# Patient Record
Sex: MALE
[De-identification: names, ages, dates, MRNs, and addresses within clinical notes are randomized per-mention and may not be internally consistent; named-entity substitution may affect disease eponyms.]

---

## 2021-06-16 DIAGNOSIS — Z78.9 OTHER SPECIFIED HEALTH STATUS: ICD-10-CM

## 2021-06-16 DIAGNOSIS — Z82.0 FAMILY HISTORY OF EPILEPSY AND OTHER DISEASES OF THE NERVOUS SYSTEM: ICD-10-CM

## 2021-06-16 DIAGNOSIS — R42 DIZZINESS AND GIDDINESS: ICD-10-CM

## 2021-06-16 PROBLEM — Z00.00 ENCOUNTER FOR PREVENTIVE HEALTH EXAMINATION: Status: ACTIVE | Noted: 2021-06-16

## 2021-06-16 RX ORDER — ASPIRIN ENTERIC COATED TABLETS 81 MG 81 MG/1
81 TABLET, DELAYED RELEASE ORAL DAILY
Refills: 0 | Status: ACTIVE | COMMUNITY

## 2021-06-16 RX ORDER — MULTIVITAMIN
TABLET ORAL DAILY
Refills: 0 | Status: ACTIVE | COMMUNITY

## 2021-06-16 RX ORDER — AMLODIPINE BESYLATE 10 MG/1
10 TABLET ORAL DAILY
Refills: 0 | Status: ACTIVE | COMMUNITY

## 2021-06-29 ENCOUNTER — NON-APPOINTMENT (OUTPATIENT)
Age: 56
End: 2021-06-29

## 2021-06-29 ENCOUNTER — APPOINTMENT (OUTPATIENT)
Dept: CARDIOLOGY | Facility: CLINIC | Age: 56
End: 2021-06-29
Payer: COMMERCIAL

## 2021-06-29 VITALS
OXYGEN SATURATION: 98 % | WEIGHT: 190 LBS | BODY MASS INDEX: 24.38 KG/M2 | DIASTOLIC BLOOD PRESSURE: 84 MMHG | SYSTOLIC BLOOD PRESSURE: 144 MMHG | HEART RATE: 55 BPM | HEIGHT: 74 IN

## 2021-06-29 DIAGNOSIS — I10 ESSENTIAL (PRIMARY) HYPERTENSION: ICD-10-CM

## 2021-06-29 DIAGNOSIS — R55 SYNCOPE AND COLLAPSE: ICD-10-CM

## 2021-06-29 DIAGNOSIS — Z87.898 PERSONAL HISTORY OF OTHER SPECIFIED CONDITIONS: ICD-10-CM

## 2021-06-29 DIAGNOSIS — R07.89 OTHER CHEST PAIN: ICD-10-CM

## 2021-06-29 DIAGNOSIS — R42 DIZZINESS AND GIDDINESS: ICD-10-CM

## 2021-06-29 PROCEDURE — 99204 OFFICE O/P NEW MOD 45 MIN: CPT

## 2021-06-29 PROCEDURE — 93000 ELECTROCARDIOGRAM COMPLETE: CPT

## 2021-06-29 PROCEDURE — 99072 ADDL SUPL MATRL&STAF TM PHE: CPT

## 2021-06-30 PROBLEM — Z87.898 HISTORY OF PALPITATIONS: Status: RESOLVED | Noted: 2021-06-16 | Resolved: 2021-06-30

## 2021-06-30 PROBLEM — I10 HTN (HYPERTENSION): Status: ACTIVE | Noted: 2021-06-16

## 2021-06-30 PROBLEM — R42 DIZZINESS: Status: ACTIVE | Noted: 2021-06-29

## 2021-06-30 PROBLEM — R07.89 CHEST TIGHTNESS: Status: RESOLVED | Noted: 2021-06-16 | Resolved: 2021-06-30

## 2021-06-30 LAB
CHOLEST SERPL-MCNC: 157 MG/DL
HDLC SERPL-MCNC: 54 MG/DL
LDLC SERPL CALC-MCNC: 92 MG/DL
NONHDLC SERPL-MCNC: 103 MG/DL
TRIGL SERPL-MCNC: 56 MG/DL

## 2021-06-30 NOTE — DISCUSSION/SUMMARY
[Hypertension] : hypertension [FreeTextEntry1] : \par Currently stable from a cardiovascular standpoint. Hypertensive today. Appears euvolemic. Unclear etiology of dizziness or feeling unsteady on his feet some times. Consider neurologic etiology. Prior cardiac testing results reviewed (stress ECG and echo). Continue current medications. ECG completed today and reviewed. Patient previously noted to have elevated triglycerides (non-fasting) and was prescribed atorvastatin 20 mg which he did not start. Repeat fasting lipid profile today revealed normal triglyceride level and otherwise acceptable lipid levels. Patient has appointment to see neurology. Advised patient to monitor BP and HR with future episodes. Doubt cardiac etiology. Follow up prn.

## 2021-06-30 NOTE — HISTORY OF PRESENT ILLNESS
[FreeTextEntry1] : Patient with history of HTN presents for further evaluation of symptoms of pre-syncope. Has been doing okay but has been experiencing episodes of dizziness and being off balance since September. Denies chest pain, shortness of breath or palpitations. Denies actual syncope. Also sometimes feels like he is "drunk" when walking. Also when he feels symptoms of dizziness, he may also have some trouble getting words out. Has some trouble speaking as per wife during some of the episodes.

## 2021-06-30 NOTE — CARDIOLOGY SUMMARY
[de-identified] : 06/29/21 - sinus bradycardia, first degree AV block\par  [de-identified] : 03/04/21 (exercise ECG) - 11 METS, 82% MPHR, no EKG evidence of ischemia at submaximal exercise HR\par  [de-identified] : 10/21/20 - trace MR, normal LA, trace TR, LVEF 55%

## 2021-08-19 ENCOUNTER — APPOINTMENT (OUTPATIENT)
Dept: NEUROLOGY | Facility: CLINIC | Age: 56
End: 2021-08-19
Payer: COMMERCIAL

## 2021-08-19 ENCOUNTER — NON-APPOINTMENT (OUTPATIENT)
Age: 56
End: 2021-08-19

## 2021-08-19 VITALS — HEART RATE: 71 BPM | DIASTOLIC BLOOD PRESSURE: 85 MMHG | SYSTOLIC BLOOD PRESSURE: 124 MMHG

## 2021-08-19 VITALS
SYSTOLIC BLOOD PRESSURE: 132 MMHG | HEIGHT: 74 IN | DIASTOLIC BLOOD PRESSURE: 87 MMHG | WEIGHT: 190 LBS | HEART RATE: 62 BPM | BODY MASS INDEX: 24.38 KG/M2

## 2021-08-19 VITALS — DIASTOLIC BLOOD PRESSURE: 89 MMHG | HEART RATE: 63 BPM | SYSTOLIC BLOOD PRESSURE: 133 MMHG

## 2021-08-19 DIAGNOSIS — R26.89 OTHER ABNORMALITIES OF GAIT AND MOBILITY: ICD-10-CM

## 2021-08-19 DIAGNOSIS — Z82.49 FAMILY HISTORY OF ISCHEMIC HEART DISEASE AND OTHER DISEASES OF THE CIRCULATORY SYSTEM: ICD-10-CM

## 2021-08-19 PROCEDURE — 99205 OFFICE O/P NEW HI 60 MIN: CPT

## 2021-08-19 RX ORDER — ATORVASTATIN CALCIUM 20 MG/1
20 TABLET, FILM COATED ORAL
Qty: 90 | Refills: 0 | Status: DISCONTINUED | COMMUNITY
Start: 2021-03-05 | End: 2021-08-19

## 2021-08-19 NOTE — REVIEW OF SYSTEMS
[As Noted in HPI] : as noted in HPI [Hand Weakness] :  hand weakness [Lightheadedness] : lightheadedness [Difficulty Walking] : difficulty walking [Joint Pain] : joint pain [Negative] : Heme/Lymph [Fever] : no fever [Chills] : no chills [Feeling Tired] : not feeling tired [Sleep Disturbances] : no sleep disturbances [Anxiety] : no anxiety [Tingling] : no tingling [Abnormal Sensation] : no abnormal sensation [Migraine Headache] : no migraine headache [Tension Headache] : no tension-type headache [Frequent Falls] : not falling

## 2021-08-19 NOTE — HISTORY OF PRESENT ILLNESS
[FreeTextEntry1] : Patient reports that back in September 2020 he started to experience dizziness describes as lightheaded and faint. It would last minutes and then resolve. He never passed out or lost awareness. He then went to have a cardiac workup and it was all negative. He had his blood pressure medications changed from benicar to norvasc. 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 \par He also feels headache on back of left side above neck and worse with balance. \par He is much more cautious and attentive

## 2021-08-19 NOTE — DISCUSSION/SUMMARY
[FreeTextEntry1] : 1. Patient with poor balance and gait and stiffness on the left side of his body and suggestive of early idiopathic parkinson's disease\par \par 2. Will obtain SHAYNE scan to confirm and \par \par 3. Then start PT 1-2 times per week and sinemet 25/100 1/2 tab twice daily and will titrate up to goal \par \par 4. Follow up in 2 months and all questions answered

## 2021-08-19 NOTE — PHYSICAL EXAM
[General Appearance - Alert] : alert [Oriented To Time, Place, And Person] : oriented to person, place, and time [Person] : oriented to person [Place] : oriented to place [Time] : oriented to time [Cranial Nerves Optic (II)] : visual acuity intact bilaterally,  visual fields full to confrontation, pupils equal round and reactive to light [Cranial Nerves Oculomotor (III)] : extraocular motion intact [Cranial Nerves Trigeminal (V)] : facial sensation intact symmetrically [Cranial Nerves Facial (VII)] : face symmetrical [Cranial Nerves Vestibulocochlear (VIII)] : hearing was intact bilaterally [Cranial Nerves Glossopharyngeal (IX)] : tongue and palate midline [Cranial Nerves Accessory (XI - Cranial And Spinal)] : head turning and shoulder shrug symmetric [Cranial Nerves Hypoglossal (XII)] : there was no tongue deviation with protrusion [Motor Strength] : muscle strength was normal in all four extremities [Involuntary Movements] : no involuntary movements were seen [No Muscle Atrophy] : normal bulk in all four extremities [Motor Handedness Right-Handed] : the patient is right hand dominant [Paresis Pronator Drift Right-Sided] : no pronator drift on the right [Paresis Pronator Drift Left-Sided] : no pronator drift on the left [Sensation Tactile Decrease] : light touch was intact [Sensation Pain / Temperature Decrease] : pain and temperature was intact [Romberg's Sign] : Romberg's sign was negtive [Limited Balance] : the patient's balance was impaired [Past-pointing] : there was no past-pointing [Tremor] : no tremor present [Coordination - Dysmetria Impaired Finger-to-Nose Bilateral] : not present [2+] : Brachioradialis left 2+ [3+] : Ankle jerk left 3+ [Plantar Reflex Right Only] : normal on the right [Plantar Reflex Left Only] : normal on the left [FreeTextEntry1] : patient with mask facies, overall bradykinesia and worse on the left side and postural instability and decreased fine motor movements on left side.\par \par Increased tone and stiffness on the left side and decreased arm swing bilaterally \par  [Extraocular Movements] : extraocular movements were intact [Neck Appearance] : the appearance of the neck was normal [] : no rash [Skin Lesions] : no skin lesions

## 2021-09-01 ENCOUNTER — APPOINTMENT (OUTPATIENT)
Dept: NUCLEAR MEDICINE | Facility: IMAGING CENTER | Age: 56
End: 2021-09-01
Payer: COMMERCIAL

## 2021-09-01 ENCOUNTER — RESULT REVIEW (OUTPATIENT)
Age: 56
End: 2021-09-01

## 2021-09-01 ENCOUNTER — OUTPATIENT (OUTPATIENT)
Dept: OUTPATIENT SERVICES | Facility: HOSPITAL | Age: 56
LOS: 1 days | End: 2021-09-01
Payer: COMMERCIAL

## 2021-09-01 DIAGNOSIS — G20 PARKINSON'S DISEASE: ICD-10-CM

## 2021-09-01 PROCEDURE — 78803 RP LOCLZJ TUM SPECT 1 AREA: CPT | Mod: 26

## 2021-09-01 PROCEDURE — 78803 RP LOCLZJ TUM SPECT 1 AREA: CPT

## 2021-09-01 PROCEDURE — A9584: CPT

## 2021-09-03 ENCOUNTER — NON-APPOINTMENT (OUTPATIENT)
Age: 56
End: 2021-09-03

## 2021-09-07 ENCOUNTER — NON-APPOINTMENT (OUTPATIENT)
Age: 56
End: 2021-09-07

## 2021-09-10 ENCOUNTER — TRANSCRIPTION ENCOUNTER (OUTPATIENT)
Age: 56
End: 2021-09-10

## 2021-10-15 ENCOUNTER — APPOINTMENT (OUTPATIENT)
Dept: NEUROLOGY | Facility: CLINIC | Age: 56
End: 2021-10-15

## 2021-11-09 ENCOUNTER — APPOINTMENT (OUTPATIENT)
Dept: NEUROLOGY | Facility: CLINIC | Age: 56
End: 2021-11-09
Payer: COMMERCIAL

## 2021-11-09 VITALS
HEIGHT: 74 IN | HEART RATE: 73 BPM | DIASTOLIC BLOOD PRESSURE: 82 MMHG | WEIGHT: 192 LBS | BODY MASS INDEX: 24.64 KG/M2 | SYSTOLIC BLOOD PRESSURE: 133 MMHG

## 2021-11-09 PROCEDURE — 99215 OFFICE O/P EST HI 40 MIN: CPT

## 2021-11-09 NOTE — PHYSICAL EXAM
[FreeTextEntry1] : Patient is awake and alert.  He is pleasant and cooperative with the exam.\par Face is symmetric.  Extraocular movements are intact.\par No resting action or postural tremors are seen\par Bilateral bradykinesia hand opening and closing one on the right, three on the left\par Rapid alternating movements normal on the right one on the left\par Finger taps one on the right two on the left\par Rigidity one on the right two on the left\par Leg agility normal bilaterally\par No difficulty getting up from the chair\par Gait with minimal slowing of the left leg otherwise unremarkable

## 2021-11-09 NOTE — DATA REVIEWED
[de-identified] : MRI brain done in February 2021 unremarkable\par MRI lumbar spine done September 2020 with degenerative changes most prominent at L5-S1 with bilateral neuroforaminal stenosis

## 2021-11-09 NOTE — HISTORY OF PRESENT ILLNESS
[FreeTextEntry1] : This is a 56-year-old right-handed male who presents with chief complaints of Parkinson's disease.  At this visit he is accompanied by his wife.  History is obtained from both of them and from review of records\par \par Patient states that for the last year he has noticed dragging of the left foot reduced ringing of the left arm he was reporting funny feelings in his left side and also having difficulty coming up with the right word.  An MRI brain done February 2021 was unremarkable.  MRI lumbar spine done September 28, 2020 showed degenerative disc disease and facet arthropathy most prominent at L5-S1 where there is moderate bilateral neuroforaminal stenosis.\par DaTscan done September 1, 2021 was abnormal showing dopamine dysfunction, consistent with Parkinson's disease\par \par Patient was initiated on Sinemet 25/100 half a tablet three times a day.  He reports 70% improvement of his walking balance and speaking.\par He is also undergoing physical therapy twice a week\par \par His wife reports mild memory changes mostly short-term but they have improved since levodopa was started, no hallucinations\par Denied dysphagia, drooling, urinary incontinence, anosmia ,blood pressure fluctuations or REM behavior disorder.\par Mild constipation\par \par Review of systems-a complete review of systems was performed and is negative except as listed in HPI\par \par Family history-Father had possible Parkinson's disease in his late 50s, passed away in his 70s\par

## 2021-11-09 NOTE — DISCUSSION/SUMMARY
[FreeTextEntry1] : This is a 56-year-old right-handed male with chief complaints of Parkinson's disease symptoms since 2020.  He reports 70% improvement of symptoms on half a tablet of Sinemet 25/100.  His father also developed Parkinson's disease in his 50s\par DaTscan abnormal, MRI brain unremarkable\par Impression-parkinsonism, most likely idiopathic Parkinson's disease\par \par Plan\par Discussed results of DaTscan, images reviewed\par Various treatment options including dopamine agonists, Azilect were discussed\par We will add ropinirole 0.25 mg three times a day.  Patient will gradually titrate up till he is on 1 mg three times a day in weekly increments.  Side effects were discussed in detail\par In the future we will stop levodopa\par Discussed genetics consultation, wished to hold off for now\par Neuropsych evaluation\par Encouraged to exercise and stay active \par All questions addressed and answered\par Follow-up in 1 month

## 2021-11-12 RX ORDER — ROPINIROLE 0.25 MG/1
0.25 TABLET, FILM COATED ORAL
Qty: 270 | Refills: 3 | Status: DISCONTINUED | COMMUNITY
Start: 2021-11-09 | End: 2021-11-12

## 2021-11-19 ENCOUNTER — TRANSCRIPTION ENCOUNTER (OUTPATIENT)
Age: 56
End: 2021-11-19

## 2021-11-23 ENCOUNTER — TRANSCRIPTION ENCOUNTER (OUTPATIENT)
Age: 56
End: 2021-11-23

## 2021-12-08 ENCOUNTER — TRANSCRIPTION ENCOUNTER (OUTPATIENT)
Age: 56
End: 2021-12-08

## 2022-02-01 ENCOUNTER — APPOINTMENT (OUTPATIENT)
Dept: NEUROLOGY | Facility: CLINIC | Age: 57
End: 2022-02-01
Payer: COMMERCIAL

## 2022-02-01 VITALS
SYSTOLIC BLOOD PRESSURE: 144 MMHG | HEART RATE: 68 BPM | BODY MASS INDEX: 25.04 KG/M2 | WEIGHT: 195 LBS | DIASTOLIC BLOOD PRESSURE: 90 MMHG

## 2022-02-01 PROCEDURE — 99214 OFFICE O/P EST MOD 30 MIN: CPT

## 2022-02-01 NOTE — DATA REVIEWED
[de-identified] : MRI brain done in February 2021 unremarkable\par MRI lumbar spine done September 2020 with degenerative changes most prominent at L5-S1 with bilateral neuroforaminal stenosis

## 2022-02-01 NOTE — HISTORY OF PRESENT ILLNESS
[FreeTextEntry1] : This is a 56-year-old right-handed male who presents with chief complaints of Parkinson's disease.  At this visit he is accompanied by his wife.  History is obtained from both of them and from review of records\par \par Patient states that for the last year he has noticed dragging of the left foot reduced ringing of the left arm he was reporting funny feelings in his left side and also having difficulty coming up with the right word.  An MRI brain done February 2021 was unremarkable.  MRI lumbar spine done September 28, 2020 showed degenerative disc disease and facet arthropathy most prominent at L5-S1 where there is moderate bilateral neuroforaminal stenosis.\par DaTscan done September 1, 2021 was abnormal showing dopamine dysfunction, consistent with Parkinson's disease\par \par Patient was initiated on Sinemet 25/100 half a tablet three times a day.  He reports 70% improvement of his walking balance and speaking.\par He is also undergoing physical therapy twice a week\par \par His wife reports mild memory changes mostly short-term but they have improved since levodopa was started, no hallucinations\par Denied dysphagia, drooling, urinary incontinence, anosmia ,blood pressure fluctuations or REM behavior disorder.\par Mild constipation\par \par Review of systems-a complete review of systems was performed and is negative except as listed in HPI\par \par Family history-Father had possible Parkinson's disease in his late 50s, passed away in his 70s\par \par Interval history February 1, 2022.-Patient presents to follow-up on Parkinson's disease.  He states that he tried both ropinirole and pramipexole however even at low doses he felt that his short-term memory was worse.  At present he feels that his long-term memory is excellent but short-term memory is not good.  He denies any changes in mood.  Currently taking Sinemet half a tablet twice a day.  He does feel some response with this medication.  He feels his balance is not good, he needs to concentrate on walking heel-to-toe.  He is not had any falls he goes for physical therapy twice a week.  He reports numbness, not exactly pain in his lower back and left leg.  No dysphagia no hallucinations\par

## 2022-02-01 NOTE — DISCUSSION/SUMMARY
[FreeTextEntry1] : This is a 56-year-old right-handed male with chief complaints of Parkinson's disease symptoms since 2020.  He reports 70% improvement of symptoms on half a tablet of Sinemet 25/100.  His father also developed Parkinson's disease in his 50s\par DaTscan abnormal, MRI brain unremarkable\par Did not tolerate pramipexole or ropinirole due to cognitive side effects\par \par Impression- parkinsonism, most likely idiopathic Parkinson's disease\par \par Plan\par Increase Sinemet from half a tablet twice a day to 1 tablet twice a day.  Side effects discussed in detail\par May consider Azilect in the future.  Patient denies any mood symptoms\par Neuropsych evaluation\par EMG as patient is reporting numbness in the back and left leg\par Continue physical therapy\par Follow-up in 2 months\par Discussed genetics consultation, wished to hold off for now\par Encouraged to exercise and stay active \par All questions addressed and answered\par

## 2022-03-30 ENCOUNTER — APPOINTMENT (OUTPATIENT)
Dept: NEUROLOGY | Facility: CLINIC | Age: 57
End: 2022-03-30

## 2022-05-06 LAB
FOLATE SERPL-MCNC: 17.6 NG/ML
TSH SERPL-ACNC: 1.99 UIU/ML
VIT B12 SERPL-MCNC: 338 PG/ML

## 2022-05-13 ENCOUNTER — APPOINTMENT (OUTPATIENT)
Dept: NEUROLOGY | Facility: CLINIC | Age: 57
End: 2022-05-13
Payer: COMMERCIAL

## 2022-05-13 VITALS
DIASTOLIC BLOOD PRESSURE: 82 MMHG | WEIGHT: 195 LBS | RESPIRATION RATE: 14 BRPM | HEART RATE: 62 BPM | SYSTOLIC BLOOD PRESSURE: 134 MMHG | BODY MASS INDEX: 25.03 KG/M2 | HEIGHT: 74 IN

## 2022-05-13 DIAGNOSIS — G47.30 SLEEP APNEA, UNSPECIFIED: ICD-10-CM

## 2022-05-13 PROCEDURE — 99214 OFFICE O/P EST MOD 30 MIN: CPT

## 2022-05-13 NOTE — PHYSICAL EXAM
[FreeTextEntry1] : Patient is awake and alert.  He is pleasant and cooperative with the exam.  Facial expression is reduced\par Face is symmetric.  Extraocular movements are intact.\par No resting action or postural tremors are seen\par Bilateral bradykinesia hand opening and closing one on the right, 2 on the left\par Rapid alternating movements 1 on the right 2 on the left\par Finger taps one on the right two on the left\par Rigidity one on the right two on the left\par Leg agility normal bilaterally\par No difficulty getting up from the chair\par Gait with minimal slowing of the left leg otherwise unremarkable

## 2022-05-13 NOTE — HISTORY OF PRESENT ILLNESS
[FreeTextEntry1] : This is a 56-year-old right-handed male who presents with chief complaints of Parkinson's disease.  At this visit he is accompanied by his wife.  History is obtained from both of them and from review of records\par \par Patient states that for the last year he has noticed dragging of the left foot reduced ringing of the left arm he was reporting funny feelings in his left side and also having difficulty coming up with the right word.  An MRI brain done February 2021 was unremarkable.  MRI lumbar spine done September 28, 2020 showed degenerative disc disease and facet arthropathy most prominent at L5-S1 where there is moderate bilateral neuroforaminal stenosis.\par DaTscan done September 1, 2021 was abnormal showing dopamine dysfunction, consistent with Parkinson's disease\par \par Patient was initiated on Sinemet 25/100 half a tablet three times a day.  He reports 70% improvement of his walking balance and speaking.\par He is also undergoing physical therapy twice a week\par \par His wife reports mild memory changes mostly short-term but they have improved since levodopa was started, no hallucinations\par Denied dysphagia, drooling, urinary incontinence, anosmia ,blood pressure fluctuations or REM behavior disorder.\par Mild constipation\par \par Review of systems-a complete review of systems was performed and is negative except as listed in HPI\par \par Family history-Father had possible Parkinson's disease in his late 50s, passed away in his 70s\par \par Interval history February 1, 2022.-Patient presents to follow-up on Parkinson's disease.  He states that he tried both ropinirole and pramipexole however even at low doses he felt that his short-term memory was worse.  At present he feels that his long-term memory is excellent but short-term memory is not good.  He denies any changes in mood.  Currently taking Sinemet half a tablet twice a day.  He does feel some response with this medication.  He feels his balance is not good, he needs to concentrate on walking heel-to-toe.  He is not had any falls he goes for physical therapy twice a week.  He reports numbness, not exactly pain in his lower back and left leg.  No dysphagia no hallucinations\par \par Interval history May 13, 2022-patient presents to follow-up on Parkinson's disease.  He states that he has good days and bad days.  He continues to feel that her short-term memory is not so good on some days.  The other times he feels well.  He denies any hallucinations.  He does endorse history of sleep apnea and does not use the machine because when he used it in the past he actually developed a pneumonia from it.  He feels stiffness and pain in the left upper extremity at times.  Currently uses Sinemet 1 tablet at 6 AM and 1 at 1 PM.  He goes to bed at 11 PM and wakes up at 5 PM.  Some days he feels tired and may take a nap.  He denies any dysphagia or falls.  He does exercise regularly with the [employer] and stretching.  His back pain and leg pain resolved hence he canceled the EMG appointment.  He had recent labs.  He also has an upcoming appointment with neuropsychology.  He states his mood is generally good.  He reports having erectile dysfunction and constipation\par

## 2022-05-13 NOTE — DATA REVIEWED
[de-identified] : MRI brain done in February 2021 unremarkable\par MRI lumbar spine done September 2020 with degenerative changes most prominent at L5-S1 with bilateral neuroforaminal stenosis

## 2022-05-13 NOTE — DISCUSSION/SUMMARY
[FreeTextEntry1] : This is a 56-year-old right-handed male with chief complaints of Parkinson's disease symptoms since 2020.  He reports good response to medication but is on a low-dose..  His father also developed Parkinson's disease in his 50s\par DaTscan abnormal, MRI brain unremarkable\par Did not tolerate pramipexole or ropinirole due to cognitive side effects\par \par Impression- parkinsonism, most likely idiopathic Parkinson's disease\par \par Plan\par Increase Sinemet from 1 tablet twice a day to 1 tablet 3 times a day 5 hours apart..  Side effects discussed in detail\par May consider Azilect in the future.  Patient denies any mood symptoms\par Neuropsych evaluation in 2 weeks\par EMG was canceled by the patient as his back pain and leg pain symptoms had resolved\par Recommended to follow-up with sleep medicine.  Referral provided for sleep apnea\par Advised to follow-up with urology regarding erectile dysfunction\par Increase fluid and fiber in diet and consider stool softeners for constipation\par Follow-up in 3 months\par Discussed genetics consultation, wished to hold off for now\par Encouraged to exercise and stay active \par All questions addressed and answered\par

## 2022-05-26 ENCOUNTER — APPOINTMENT (OUTPATIENT)
Dept: NEUROLOGY | Facility: CLINIC | Age: 57
End: 2022-05-26
Payer: COMMERCIAL

## 2022-05-26 PROCEDURE — 96132 NRPSYC TST EVAL PHYS/QHP 1ST: CPT

## 2022-05-26 PROCEDURE — 96121 NUBHVL XM PHY/QHP EA ADDL HR: CPT

## 2022-05-26 PROCEDURE — 96116 NUBHVL XM PHYS/QHP 1ST HR: CPT

## 2022-05-26 PROCEDURE — 96138 PSYCL/NRPSYC TECH 1ST: CPT

## 2022-05-26 PROCEDURE — 96133 NRPSYC TST EVAL PHYS/QHP EA: CPT

## 2022-05-26 PROCEDURE — 96139 PSYCL/NRPSYC TST TECH EA: CPT

## 2022-06-02 ENCOUNTER — APPOINTMENT (OUTPATIENT)
Dept: NEUROLOGY | Facility: CLINIC | Age: 57
End: 2022-06-02

## 2022-06-02 PROCEDURE — 96133 NRPSYC TST EVAL PHYS/QHP EA: CPT

## 2022-06-02 PROCEDURE — 96139 PSYCL/NRPSYC TST TECH EA: CPT

## 2022-06-02 PROCEDURE — 96121 NUBHVL XM PHY/QHP EA ADDL HR: CPT

## 2022-06-10 ENCOUNTER — APPOINTMENT (OUTPATIENT)
Dept: NEUROLOGY | Facility: CLINIC | Age: 57
End: 2022-06-10

## 2022-06-17 ENCOUNTER — APPOINTMENT (OUTPATIENT)
Dept: NEUROLOGY | Facility: CLINIC | Age: 57
End: 2022-06-17

## 2022-06-29 ENCOUNTER — APPOINTMENT (OUTPATIENT)
Dept: NEUROLOGY | Facility: CLINIC | Age: 57
End: 2022-06-29

## 2022-07-07 ENCOUNTER — APPOINTMENT (OUTPATIENT)
Dept: NEUROLOGY | Facility: CLINIC | Age: 57
End: 2022-07-07

## 2022-09-01 ENCOUNTER — APPOINTMENT (OUTPATIENT)
Dept: NEUROLOGY | Facility: CLINIC | Age: 57
End: 2022-09-01

## 2022-09-01 VITALS
HEIGHT: 74 IN | WEIGHT: 195 LBS | HEART RATE: 66 BPM | DIASTOLIC BLOOD PRESSURE: 83 MMHG | BODY MASS INDEX: 25.03 KG/M2 | SYSTOLIC BLOOD PRESSURE: 132 MMHG

## 2022-09-01 PROCEDURE — 99215 OFFICE O/P EST HI 40 MIN: CPT

## 2022-09-01 NOTE — DATA REVIEWED
[de-identified] : MRI brain done in February 2021 unremarkable\par MRI lumbar spine done September 2020 with degenerative changes most prominent at L5-S1 with bilateral neuroforaminal stenosis

## 2022-09-01 NOTE — HISTORY OF PRESENT ILLNESS
[FreeTextEntry1] : This is a 56-year-old right-handed male who presents with chief complaints of Parkinson's disease.  At this visit he is accompanied by his wife.  History is obtained from both of them and from review of records\par \par Patient states that for the last year he has noticed dragging of the left foot reduced ringing of the left arm he was reporting funny feelings in his left side and also having difficulty coming up with the right word.  An MRI brain done February 2021 was unremarkable.  MRI lumbar spine done September 28, 2020 showed degenerative disc disease and facet arthropathy most prominent at L5-S1 where there is moderate bilateral neuroforaminal stenosis.\par DaTscan done September 1, 2021 was abnormal showing dopamine dysfunction, consistent with Parkinson's disease\par \par Patient was initiated on Sinemet 25/100 half a tablet three times a day.  He reports 70% improvement of his walking balance and speaking.\par He is also undergoing physical therapy twice a week\par \par His wife reports mild memory changes mostly short-term but they have improved since levodopa was started, no hallucinations\par Denied dysphagia, drooling, urinary incontinence, anosmia ,blood pressure fluctuations or REM behavior disorder.\par Mild constipation\par \par Review of systems-a complete review of systems was performed and is negative except as listed in HPI\par \par Family history-Father had possible Parkinson's disease in his late 50s, passed away in his 70s\par \par Interval history February 1, 2022.-Patient presents to follow-up on Parkinson's disease.  He states that he tried both ropinirole and pramipexole however even at low doses he felt that his short-term memory was worse.  At present he feels that his long-term memory is excellent but short-term memory is not good.  He denies any changes in mood.  Currently taking Sinemet half a tablet twice a day.  He does feel some response with this medication.  He feels his balance is not good, he needs to concentrate on walking heel-to-toe.  He is not had any falls he goes for physical therapy twice a week.  He reports numbness, not exactly pain in his lower back and left leg.  No dysphagia no hallucinations\par \par Interval history May 13, 2022-patient presents to follow-up on Parkinson's disease.  He states that he has good days and bad days.  He continues to feel that her short-term memory is not so good on some days.  The other times he feels well.  He denies any hallucinations.  He does endorse history of sleep apnea and does not use the machine because when he used it in the past he actually developed a pneumonia from it.  He feels stiffness and pain in the left upper extremity at times.  Currently uses Sinemet 1 tablet at 6 AM and 1 at 1 PM.  He goes to bed at 11 PM and wakes up at 5 PM.  Some days he feels tired and may take a nap.  He denies any dysphagia or falls.  He does exercise regularly with the [employer] and stretching.  His back pain and leg pain resolved hence he canceled the EMG appointment.  He had recent labs.  He also has an upcoming appointment with neuropsychology.  He states his mood is generally good.  He reports having erectile dysfunction and constipation\par \par Interval history-September 1, 2022 patient presents to follow-up on Parkinson's disease.  He states that he feels a little slow in his walking and may drag his left leg.  He is also reporting return of low back pain which radiates up to his left hip.  He is not had any falls.  He is currently taking Sinemet 25/100, 1 tablet twice a day 5:30 in the morning and at 12 noon.  He denies any side effects on this.  No falls no difficulty swallowing no difficulty with control of blood pressure no REM behavior disorder or vivid dreams at present time he has had some vivid dreams in the past.  Memory is unchanged he had neuropsych testing done recently.  Continues to have constipation and uses Metamucil tablets as needed\par

## 2022-09-01 NOTE — DISCUSSION/SUMMARY
[FreeTextEntry1] : This is a 57-year-old right-handed male with chief complaints of Parkinson's disease symptoms since 2020.  He reports good response to medication but is on a low-dose..  His father also developed Parkinson's disease in his 50s\par DaTscan abnormal, MRI brain unremarkable\par Did not tolerate pramipexole or ropinirole due to cognitive side effects\par Neuropsych evaluation-[facility]\par \par Impression- parkinsonism, most likely idiopathic Parkinson's disease, left sided sciatica\par \par Plan\par Gabapentin 100 mg 3 times a day to help with radicular pain\par After 1 to 2 weeks of starting gabapentin he will start Azilect 0.5 mg daily\par Physical therapy\par If symptoms not improved with gabapentin and physical therapy will consider EMG nerve conduction study and referral to Ortho\par Increase Sinemet from 1 tablet twice a day to 1 tablet 3 times a day 5 hours apart..  Side effects discussed in detail\par Recommended to follow-up with sleep medicine.  Rule out sleep apnea\par Increase fluid and fiber in diet and consider stool softeners for constipation\par Follow-up in 3 months\par Discussed genetics consultation, wished to hold off for now\par Encouraged to exercise and stay active \par All questions addressed and answered\par Total face-to-face time spent with the patient 40 minutes

## 2022-09-01 NOTE — PHYSICAL EXAM
[FreeTextEntry1] : Patient is awake and alert.  He is pleasant and cooperative with the exam.  Facial expression is reduced\par Face is symmetric.  Extraocular movements are intact.\par No resting action or postural tremors are seen\par Mild bradykinesia hand opening and closing normal on the right, 1 on the left\par Rapid alternating movements 0 on the right 1 on the left\par Finger taps 0 on the right 1 on the left\par Mild rigidity on the left\par Leg agility normal bilaterally\par No difficulty getting up from the chair\par Gait with minimal slowing of the left leg and reduced left arm swing otherwise unremarkable\par Pull test is negative\par Deep tendon reflexes are equal and symmetric, strength 5/5

## 2023-01-13 ENCOUNTER — APPOINTMENT (OUTPATIENT)
Dept: NEUROLOGY | Facility: CLINIC | Age: 58
End: 2023-01-13
Payer: COMMERCIAL

## 2023-01-13 VITALS
BODY MASS INDEX: 25.41 KG/M2 | DIASTOLIC BLOOD PRESSURE: 91 MMHG | WEIGHT: 198 LBS | SYSTOLIC BLOOD PRESSURE: 147 MMHG | HEIGHT: 74 IN | HEART RATE: 62 BPM

## 2023-01-13 PROCEDURE — 99214 OFFICE O/P EST MOD 30 MIN: CPT

## 2023-01-13 RX ORDER — PRAMIPEXOLE DIHYDROCHLORIDE 0.12 MG/1
0.12 TABLET ORAL 3 TIMES DAILY
Qty: 90 | Refills: 3 | Status: DISCONTINUED | COMMUNITY
Start: 2021-11-12 | End: 2023-01-13

## 2023-01-13 RX ORDER — GABAPENTIN 100 MG/1
100 CAPSULE ORAL
Qty: 90 | Refills: 3 | Status: DISCONTINUED | COMMUNITY
Start: 2022-09-01 | End: 2023-01-13

## 2023-01-13 RX ORDER — RASAGILINE 0.5 MG/1
0.5 TABLET ORAL
Qty: 30 | Refills: 3 | Status: DISCONTINUED | COMMUNITY
Start: 2022-09-01 | End: 2023-01-13

## 2023-01-13 NOTE — DATA REVIEWED
[de-identified] : MRI brain done in February 2021 unremarkable\par MRI lumbar spine done September 2020 with degenerative changes most prominent at L5-S1 with bilateral neuroforaminal stenosis

## 2023-01-13 NOTE — HISTORY OF PRESENT ILLNESS
[FreeTextEntry1] : This is a 56-year-old right-handed male who presents with chief complaints of Parkinson's disease.  At this visit he is accompanied by his wife.  History is obtained from both of them and from review of records\par \par Patient states that for the last year he has noticed dragging of the left foot reduced ringing of the left arm he was reporting funny feelings in his left side and also having difficulty coming up with the right word.  An MRI brain done February 2021 was unremarkable.  MRI lumbar spine done September 28, 2020 showed degenerative disc disease and facet arthropathy most prominent at L5-S1 where there is moderate bilateral neuroforaminal stenosis.\par DaTscan done September 1, 2021 was abnormal showing dopamine dysfunction, consistent with Parkinson's disease\par \par Patient was initiated on Sinemet 25/100 half a tablet three times a day.  He reports 70% improvement of his walking balance and speaking.\par He is also undergoing physical therapy twice a week\par \par His wife reports mild memory changes mostly short-term but they have improved since levodopa was started, no hallucinations\par Denied dysphagia, drooling, urinary incontinence, anosmia ,blood pressure fluctuations or REM behavior disorder.\par Mild constipation\par \par Review of systems-a complete review of systems was performed and is negative except as listed in HPI\par \par Family history-Father had possible Parkinson's disease in his late 50s, passed away in his 70s\par \par Interval history February 1, 2022.-Patient presents to follow-up on Parkinson's disease.  He states that he tried both ropinirole and pramipexole however even at low doses he felt that his short-term memory was worse.  At present he feels that his long-term memory is excellent but short-term memory is not good.  He denies any changes in mood.  Currently taking Sinemet half a tablet twice a day.  He does feel some response with this medication.  He feels his balance is not good, he needs to concentrate on walking heel-to-toe.  He is not had any falls he goes for physical therapy twice a week.  He reports numbness, not exactly pain in his lower back and left leg.  No dysphagia no hallucinations\par \par Interval history May 13, 2022-patient presents to follow-up on Parkinson's disease.  He states that he has good days and bad days.  He continues to feel that her short-term memory is not so good on some days.  The other times he feels well.  He denies any hallucinations.  He does endorse history of sleep apnea and does not use the machine because when he used it in the past he actually developed a pneumonia from it.  He feels stiffness and pain in the left upper extremity at times.  Currently uses Sinemet 1 tablet at 6 AM and 1 at 1 PM.  He goes to bed at 11 PM and wakes up at 5 PM.  Some days he feels tired and may take a nap.  He denies any dysphagia or falls.  He does exercise regularly with the Pel[employer] and stretching.  His back pain and leg pain resolved hence he canceled the EMG appointment.  He had recent labs.  He also has an upcoming appointment with neuropsychology.  He states his mood is generally good.  He reports having erectile dysfunction and constipation\par \par Interval history-September 1, 2022 patient presents to follow-up on Parkinson's disease.  He states that he feels a little slow in his walking and may drag his left leg.  He is also reporting return of low back pain which radiates up to his left hip.  He is not had any falls.  He is currently taking Sinemet 25/100, 1 tablet twice a day 5:30 in the morning and at 12 noon.  He denies any side effects on this.  No falls no difficulty swallowing no difficulty with control of blood pressure no REM behavior disorder or vivid dreams at present time he has had some vivid dreams in the past.  Memory is unchanged he had neuropsych testing done recently.  Continues to have constipation and uses Metamucil tablets as needed\par \par Interval history January 13, 2023.  Patient presents to follow-up on Parkinson's disease overall states that he is doing well still taking only Sinemet twice a day and finds that that is helpful if he does not take his medication in the morning he feels a little stiffness in his neck.  He is noticing some slowness while walking and has no difficulty running and has resumed basketball.  States that balance is good has not had any falls denies any difficulty swallowing.  Left-sided sciatica has improved with physical therapy even a small dose of gabapentin was making him sleepy and he is not on it anymore.  Did not try Azilect.  Still continues to have constipation and is taking stool softeners for it\par

## 2023-01-13 NOTE — DISCUSSION/SUMMARY
[FreeTextEntry1] : This is a 57-year-old right-handed male with chief complaints of Parkinson's disease symptoms since 2020.  He reports good response to medication but is on a low-dose..  His father also developed Parkinson's disease in his 50s\par DaTscan abnormal, MRI brain unremarkable\par Did not tolerate pramipexole or ropinirole due to cognitive side effects\par Neuropsych evaluation-[facility]\par \par Impression- parkinsonism, most likely idiopathic Parkinson's disease, left sided sciatica, improved with physical therapy\par \par Plan\par Patient wishes to continue Sinemet 1 tablet twice a day.  States that he has no difficulties in the evening.\par Increase fluid and fiber in diet and consider stool softeners for constipation\par Follow-up in 4-5 months\par In the past discussed genetics consultation, wished to hold off \par Encouraged to exercise and stay active, careful while playing basketball\par All questions addressed and answered\par

## 2023-05-24 ENCOUNTER — APPOINTMENT (OUTPATIENT)
Dept: NEUROLOGY | Facility: CLINIC | Age: 58
End: 2023-05-24
Payer: COMMERCIAL

## 2023-05-24 VITALS
BODY MASS INDEX: 24.13 KG/M2 | HEART RATE: 61 BPM | DIASTOLIC BLOOD PRESSURE: 85 MMHG | HEIGHT: 74 IN | SYSTOLIC BLOOD PRESSURE: 132 MMHG | WEIGHT: 188 LBS

## 2023-05-24 VITALS — DIASTOLIC BLOOD PRESSURE: 77 MMHG | HEART RATE: 66 BPM | SYSTOLIC BLOOD PRESSURE: 116 MMHG

## 2023-05-24 DIAGNOSIS — R20.2 PARESTHESIA OF SKIN: ICD-10-CM

## 2023-05-24 PROCEDURE — 99214 OFFICE O/P EST MOD 30 MIN: CPT

## 2023-05-25 NOTE — PHYSICAL EXAM
[FreeTextEntry1] : Patient is awake and alert.  He is pleasant and cooperative with the exam.  Facial expression is reduced\par Face is symmetric.  Extraocular movements are intact.\par No resting action or postural tremors are seen\par  bradykinesia hand opening and closing 1 on the right, 1-2 on the left\par Rapid alternating movements 0 on the right 1 on the left\par Finger taps 0 on the right 1 on the left\par Mild rigidity on the left\par Leg agility normal bilaterally\par No difficulty getting up from the chair\par Gait with minimal slowing of the left leg and reduced left arm swing otherwise unremarkable\par Pull test is negative\par Deep tendon reflexes are equal and symmetric, strength 5/5\par No rashes are seen on the back.  Pinprick and light touch normal on both sides

## 2023-05-25 NOTE — DATA REVIEWED
[de-identified] : MRI brain done in February 2021 unremarkable\par MRI lumbar spine done September 2020 with degenerative changes most prominent at L5-S1 with bilateral neuroforaminal stenosis

## 2023-05-25 NOTE — DISCUSSION/SUMMARY
[FreeTextEntry1] : This is a 57-year-old right-handed male with chief complaints of Parkinson's disease symptoms since 2020.  He reports good response to medication but is on a low-dose..  His father also developed Parkinson's disease in his 50s\par DaTscan abnormal, MRI brain unremarkable\par Did not tolerate pramipexole or ropinirole due to cognitive side effects\par Neuropsych evaluation-[facility]\par \par Impression- parkinsonism, most likely idiopathic Parkinson's disease, left sided sciatica, improved with physical therapy\par \par Plan\par Patient is reporting area of numbness in the left mid back.  No rashes is seen.  Suggest MRI cervical and thoracic spine with and without contrast.  He is also advised to follow with his primary care physician\par Patient wishes to continue Sinemet 1 tablet twice a day.  Suggested trying 1 tablet 3 times a day\par Increase fluid and fiber in diet and consider stool softeners for constipation, MiraLAX, senna or Dulcolax\par Follow-up in 3 months\par In the past discussed genetics consultation, wished to hold off \par \par All questions addressed and answered\par

## 2023-05-25 NOTE — HISTORY OF PRESENT ILLNESS
[FreeTextEntry1] : Franky Mclean is a 56-year-old right-handed male who presents with chief complaints of Parkinson's disease.  At this visit he is accompanied by his wife.  History is obtained from both of them and from review of records\par \par Patient states that for the last year he has noticed dragging of the left foot reduced ringing of the left arm he was reporting funny feelings in his left side and also having difficulty coming up with the right word.  An MRI brain done February 2021 was unremarkable.  MRI lumbar spine done September 28, 2020 showed degenerative disc disease and facet arthropathy most prominent at L5-S1 where there is moderate bilateral neuroforaminal stenosis.\par DaTscan done September 1, 2021 was abnormal showing dopamine dysfunction, consistent with Parkinson's disease\par \par Patient was initiated on Sinemet 25/100 half a tablet three times a day.  He reports 70% improvement of his walking balance and speaking.\par He is also undergoing physical therapy twice a week\par \par His wife reports mild memory changes mostly short-term but they have improved since levodopa was started, no hallucinations\par Denied dysphagia, drooling, urinary incontinence, anosmia ,blood pressure fluctuations or REM behavior disorder.\par Mild constipation\par \par Review of systems-a complete review of systems was performed and is negative except as listed in HPI\par \par Family history-Father had possible Parkinson's disease in his late 50s, passed away in his 70s\par \par Interval history February 1, 2022.-Patient presents to follow-up on Parkinson's disease.  He states that he tried both ropinirole and pramipexole however even at low doses he felt that his short-term memory was worse.  At present he feels that his long-term memory is excellent but short-term memory is not good.  He denies any changes in mood.  Currently taking Sinemet half a tablet twice a day.  He does feel some response with this medication.  He feels his balance is not good, he needs to concentrate on walking heel-to-toe.  He is not had any falls he goes for physical therapy twice a week.  He reports numbness, not exactly pain in his lower back and left leg.  No dysphagia no hallucinations\par \par Interval history May 13, 2022-patient presents to follow-up on Parkinson's disease.  He states that he has good days and bad days.  He continues to feel that her short-term memory is not so good on some days.  The other times he feels well.  He denies any hallucinations.  He does endorse history of sleep apnea and does not use the machine because when he used it in the past he actually developed a pneumonia from it.  He feels stiffness and pain in the left upper extremity at times.  Currently uses Sinemet 1 tablet at 6 AM and 1 at 1 PM.  He goes to bed at 11 PM and wakes up at 5 PM.  Some days he feels tired and may take a nap.  He denies any dysphagia or falls.  He does exercise regularly with the [employer] and stretching.  His back pain and leg pain resolved hence he canceled the EMG appointment.  He had recent labs.  He also has an upcoming appointment with neuropsychology.  He states his mood is generally good.  He reports having erectile dysfunction and constipation\par \par Interval history-September 1, 2022 patient presents to follow-up on Parkinson's disease.  He states that he feels a little slow in his walking and may drag his left leg.  He is also reporting return of low back pain which radiates up to his left hip.  He is not had any falls.  He is currently taking Sinemet 25/100, 1 tablet twice a day 5:30 in the morning and at 12 noon.  He denies any side effects on this.  No falls no difficulty swallowing no difficulty with control of blood pressure no REM behavior disorder or vivid dreams at present time he has had some vivid dreams in the past.  Memory is unchanged he had neuropsych testing done recently.  Continues to have constipation and uses Metamucil tablets as needed\par \par Interval history January 13, 2023.  Patient presents to follow-up on Parkinson's disease overall states that he is doing well still taking only Sinemet twice a day and finds that that is helpful if he does not take his medication in the morning he feels a little stiffness in his neck.  He is noticing some slowness while walking and has no difficulty running and has resumed basketball.  States that balance is good has not had any falls denies any difficulty swallowing.  Left-sided sciatica has improved with physical therapy even a small dose of gabapentin was making him sleepy and he is not on it anymore.  Did not try Azilect.  Still continues to have constipation and is taking stool softeners for it\par \par Interval history May 25, 2023.  In terms of Parkinson's disease patient states that he is stable may be a little slower than before.  Still takes Sinemet only twice a day reporting numbness on the left side of his back.  He states that since area that usually feels numb has been going on for about a month.with sudden movements sometimes he has burning pain in that area which also resolves after some time.  No changes in bowel or bladder habits.  Still has constipation, chronic.  Denies any sciatica-like symptoms there is no radiation of pain and seems to stay localized in the left paraspinal region\par

## 2023-09-15 ENCOUNTER — OUTPATIENT (OUTPATIENT)
Dept: OUTPATIENT SERVICES | Facility: HOSPITAL | Age: 58
LOS: 1 days | End: 2023-09-15
Payer: COMMERCIAL

## 2023-09-15 ENCOUNTER — APPOINTMENT (OUTPATIENT)
Dept: MRI IMAGING | Facility: CLINIC | Age: 58
End: 2023-09-15
Payer: COMMERCIAL

## 2023-09-15 ENCOUNTER — RESULT REVIEW (OUTPATIENT)
Age: 58
End: 2023-09-15

## 2023-09-15 DIAGNOSIS — Z00.8 ENCOUNTER FOR OTHER GENERAL EXAMINATION: ICD-10-CM

## 2023-09-15 PROCEDURE — 73718 MRI LOWER EXTREMITY W/O DYE: CPT

## 2023-09-15 PROCEDURE — 73718 MRI LOWER EXTREMITY W/O DYE: CPT | Mod: 26,LT

## 2024-05-08 ENCOUNTER — APPOINTMENT (OUTPATIENT)
Dept: NEUROLOGY | Facility: CLINIC | Age: 59
End: 2024-05-08
Payer: COMMERCIAL

## 2024-05-08 VITALS
HEIGHT: 74 IN | HEART RATE: 76 BPM | DIASTOLIC BLOOD PRESSURE: 77 MMHG | BODY MASS INDEX: 24.38 KG/M2 | SYSTOLIC BLOOD PRESSURE: 117 MMHG | WEIGHT: 190 LBS

## 2024-05-08 DIAGNOSIS — G20.A1 PARKINSON'S DISEASE WITHOUT DYSKINESIA, WITHOUT MENTION OF FLUCTUATIONS: ICD-10-CM

## 2024-05-08 DIAGNOSIS — R41.89 OTHER SYMPTOMS AND SIGNS INVOLVING COGNITIVE FUNCTIONS AND AWARENESS: ICD-10-CM

## 2024-05-08 DIAGNOSIS — M54.30 SCIATICA, UNSPECIFIED SIDE: ICD-10-CM

## 2024-05-08 PROCEDURE — 99214 OFFICE O/P EST MOD 30 MIN: CPT

## 2024-05-08 PROCEDURE — G2211 COMPLEX E/M VISIT ADD ON: CPT | Mod: NC,1L

## 2024-05-08 RX ORDER — CARBIDOPA AND LEVODOPA 25; 100 MG/1; MG/1
25-100 TABLET ORAL 3 TIMES DAILY
Qty: 540 | Refills: 3 | Status: ACTIVE | COMMUNITY
Start: 2021-08-19 | End: 1900-01-01

## 2024-05-08 RX ORDER — GABAPENTIN 100 MG/1
100 CAPSULE ORAL
Qty: 270 | Refills: 3 | Status: ACTIVE | COMMUNITY
Start: 2024-05-08 | End: 1900-01-01

## 2024-05-08 NOTE — DISCUSSION/SUMMARY
[FreeTextEntry1] : This is a 59-year-old right-handed male with chief complaints of Parkinson's disease symptoms since 2020.  He reports good response to medication but is on a low-dose..  His father also developed Parkinson's disease in his 50s DaTscan abnormal, MRI brain unremarkable Did not tolerate pramipexole or ropinirole due to cognitive side effects Neuropsych evaluation-[facility] Has history of sleep apnea not treated Impression- parkinsonism, most likely idiopathic Parkinson's disease, left sided sciatica, improved with physical therapy in the past today presents with return of symptoms Area of numbness and pain on the left paraspinal region has resolved.  Patient did not go for MRI of the cervical thoracic spine  Plan -Increase Sinemet gently over time to 2 tablets 3 times a day Gabapentin 100 mg will gradually increase to 3 times a day side effects discussed in detail Physical therapy, if sciatica symptoms do not improve in 3 to 6 months may consider repeat imaging of the lumbar spine Encouraged to go for sleep study.  Patient is agreeable In the past normal B12 folate thyroid.  Neuropsych testing consistent with [facility].  May consider repeating in the future Patient requests referral to urology as he is experiencing some blood in urine Follow-up in 3 months In the past discussed genetics consultation, wished to hold off   All questions addressed and answered

## 2024-05-08 NOTE — DATA REVIEWED
[de-identified] : MRI brain done in February 2021 unremarkable\par  MRI lumbar spine done September 2020 with degenerative changes most prominent at L5-S1 with bilateral neuroforaminal stenosis

## 2024-05-08 NOTE — PHYSICAL EXAM
[FreeTextEntry1] : Patient is awake and alert.  He is pleasant and cooperative with the exam.  Facial expression is reduced Face is symmetric.  Extraocular movements are intact. No resting action or postural tremors are seen  bradykinesia hand opening and closing 1 on the right, 3 on the left Rapid alternating movements 1 on the right 3 on the left Finger taps 1 on the right 3 on the left Mild rigidity on the left Leg agility normal on the right 1 on the left No difficulty getting up from the chair Gait with shuffling, small steps, reduced bilateral left greater than right arm swing otherwise unremarkable Pull test is negative Deep tendon reflexes are equal and symmetric, strength 5/5 No rashes are seen on the back.  Pinprick and light touch normal on both sides

## 2024-05-08 NOTE — HISTORY OF PRESENT ILLNESS
[FreeTextEntry1] : Franky Mclean is a 56-year-old right-handed male who presents with chief complaints of Parkinson's disease.  At this visit he is accompanied by his wife.  History is obtained from both of them and from review of records  Patient states that for the last year he has noticed dragging of the left foot reduced ringing of the left arm he was reporting funny feelings in his left side and also having difficulty coming up with the right word.  An MRI brain done February 2021 was unremarkable.  MRI lumbar spine done September 28, 2020 showed degenerative disc disease and facet arthropathy most prominent at L5-S1 where there is moderate bilateral neuroforaminal stenosis. DaTscan done September 1, 2021 was abnormal showing dopamine dysfunction, consistent with Parkinson's disease  Patient was initiated on Sinemet 25/100 half a tablet three times a day.  He reports 70% improvement of his walking balance and speaking. He is also undergoing physical therapy twice a week  His wife reports mild memory changes mostly short-term but they have improved since levodopa was started, no hallucinations Denied dysphagia, drooling, urinary incontinence, anosmia ,blood pressure fluctuations or REM behavior disorder. Mild constipation  Review of systems-a complete review of systems was performed and is negative except as listed in HPI  Family history-Father had possible Parkinson's disease in his late 50s, passed away in his 70s  Interval history February 1, 2022.-Patient presents to follow-up on Parkinson's disease.  He states that he tried both ropinirole and pramipexole however even at low doses he felt that his short-term memory was worse.  At present he feels that his long-term memory is excellent but short-term memory is not good.  He denies any changes in mood.  Currently taking Sinemet half a tablet twice a day.  He does feel some response with this medication.  He feels his balance is not good, he needs to concentrate on walking heel-to-toe.  He is not had any falls he goes for physical therapy twice a week.  He reports numbness, not exactly pain in his lower back and left leg.  No dysphagia no hallucinations  Interval history May 13, 2022-patient presents to follow-up on Parkinson's disease.  He states that he has good days and bad days.  He continues to feel that her short-term memory is not so good on some days.  The other times he feels well.  He denies any hallucinations.  He does endorse history of sleep apnea and does not use the machine because when he used it in the past he actually developed a pneumonia from it.  He feels stiffness and pain in the left upper extremity at times.  Currently uses Sinemet 1 tablet at 6 AM and 1 at 1 PM.  He goes to bed at 11 PM and wakes up at 5 PM.  Some days he feels tired and may take a nap.  He denies any dysphagia or falls.  He does exercise regularly with the [employer] and stretching.  His back pain and leg pain resolved hence he canceled the EMG appointment.  He had recent labs.  He also has an upcoming appointment with neuropsychology.  He states his mood is generally good.  He reports having erectile dysfunction and constipation  Interval history-September 1, 2022 patient presents to follow-up on Parkinson's disease.  He states that he feels a little slow in his walking and may drag his left leg.  He is also reporting return of low back pain which radiates up to his left hip.  He is not had any falls.  He is currently taking Sinemet 25/100, 1 tablet twice a day 5:30 in the morning and at 12 noon.  He denies any side effects on this.  No falls no difficulty swallowing no difficulty with control of blood pressure no REM behavior disorder or vivid dreams at present time he has had some vivid dreams in the past.  Memory is unchanged he had neuropsych testing done recently.  Continues to have constipation and uses Metamucil tablets as needed  Interval history January 13, 2023.  Patient presents to follow-up on Parkinson's disease overall states that he is doing well still taking only Sinemet twice a day and finds that that is helpful if he does not take his medication in the morning he feels a little stiffness in his neck.  He is noticing some slowness while walking and has no difficulty running and has resumed basketball.  States that balance is good has not had any falls denies any difficulty swallowing.  Left-sided sciatica has improved with physical therapy even a small dose of gabapentin was making him sleepy and he is not on it anymore.  Did not try Azilect.  Still continues to have constipation and is taking stool softeners for it  Interval history May 25, 2023.  In terms of Parkinson's disease patient states that he is stable may be a little slower than before.  Still takes Sinemet only twice a day reporting numbness on the left side of his back.  He states that since area that usually feels numb has been going on for about a month.with sudden movements sometimes he has burning pain in that area which also resolves after some time.  No changes in bowel or bladder habits.  Still has constipation, chronic.  Denies any sciatica-like symptoms there is no radiation of pain and seems to stay localized in the left paraspinal region  Interval history May 8, 2024.  Patient is seen after 1 year to follow-up on Parkinson's disease.  He presents with several complaints today.  States that he has had sciatica-like symptoms on the left side.  The paraspinal pain that he had has now improved and he feels that if he works outside and does not take his shirt off on time then it feels a little irritated.  He did not go for MRI of the cervical or thoracic spine.  He states that overall he has slowed down and daily activities fine motor skills including handwriting shaving turning pages of a book counting money etc. are becoming slower also his walking has become slower.  He finds that when he takes Sinemet there is improvement in speech.  He has not had any falls he did have physical therapy in the past but has not been there for some time and did find it helpful.  No dysphagia.  He feels memory is also not so good he feels tired during daytime 5 years ago had a sleep study done and was told that he has mild sleep apnea at that time was not able to tolerate CPAP.  He does endorse snoring and feeling tired during daytime sometimes may have headaches on waking up

## 2024-05-31 ENCOUNTER — APPOINTMENT (OUTPATIENT)
Dept: UROLOGY | Facility: CLINIC | Age: 59
End: 2024-05-31
Payer: COMMERCIAL

## 2024-05-31 VITALS
RESPIRATION RATE: 14 BRPM | BODY MASS INDEX: 24.38 KG/M2 | TEMPERATURE: 98 F | WEIGHT: 190 LBS | DIASTOLIC BLOOD PRESSURE: 84 MMHG | OXYGEN SATURATION: 97 % | HEART RATE: 66 BPM | SYSTOLIC BLOOD PRESSURE: 133 MMHG | HEIGHT: 74 IN

## 2024-05-31 DIAGNOSIS — R31.21 ASYMPTOMATIC MICROSCOPIC HEMATURIA: ICD-10-CM

## 2024-05-31 DIAGNOSIS — Z12.5 ENCOUNTER FOR SCREENING FOR MALIGNANT NEOPLASM OF PROSTATE: ICD-10-CM

## 2024-05-31 DIAGNOSIS — Z86.79 PERSONAL HISTORY OF OTHER DISEASES OF THE CIRCULATORY SYSTEM: ICD-10-CM

## 2024-05-31 PROCEDURE — G2211 COMPLEX E/M VISIT ADD ON: CPT | Mod: NC

## 2024-05-31 PROCEDURE — 99204 OFFICE O/P NEW MOD 45 MIN: CPT

## 2024-05-31 RX ORDER — MULTIVIT-MINS/IRON/FOLIC/LYCOP 8-200-600
TABLET ORAL
Refills: 0 | Status: ACTIVE | COMMUNITY

## 2024-05-31 NOTE — PHYSICAL EXAM
[Normal Appearance] : normal appearance [Well Groomed] : well groomed [General Appearance - In No Acute Distress] : no acute distress [] : no respiratory distress [Exaggerated Use Of Accessory Muscles For Inspiration] : no accessory muscle use [Abdomen Soft] : soft [Oriented To Time, Place, And Person] : oriented to person, place, and time [Not Anxious] : not anxious [de-identified] : He declined genital exam

## 2024-05-31 NOTE — REVIEW OF SYSTEMS
[Heart Rate Is Fast] : fast heart rate [Difficulty Walking] : difficulty walking [Negative] : Heme/Lymph

## 2024-05-31 NOTE — ASSESSMENT
[FreeTextEntry1] : We discussed the difference between microscopic and gross hematuria. Potential benign and malignant urological conditions that can cause hematuria were reviewed. Also, non-urologic causes of hematuria were reviewed. Workup including renal imaging (ultrasonography, CT scan, MRI), urine studies and cystoscopy were reviewed. Workup based on risk stratification including age, degree of hematuria and risk factors were discussed. Risks of cystoscopy were discussed. Patient was made aware that despite adequate workup, the cause for hematuria may not be discovered and continued follow-up is recommended. Patient's questions were answered.  1 urinalysis showed blood and the other 1 did not.  Therefore first urinalysis will be repeated.  Also given his age group PSA level is in the upper range of normal.  PSA level will be repeated and then we will discuss results on the phone.

## 2024-05-31 NOTE — HISTORY OF PRESENT ILLNESS
[FreeTextEntry1] : He is a 59-year-old man who is seen today for initial visit.  He generally does not have significant voiding symptoms.  There is no history of gross hematuria or dysuria.  There is no known family history of urological diseases.  He brought his records which showed that in December 2023 PSA level was 3.15, A1c 5.5 and urinalysis showed 6-10 red blood cells.  In April 2024 urinalysis showed 0-2 red blood cells.  He has history of Parkinson's disease.

## 2024-06-02 LAB
APPEARANCE: CLEAR
BACTERIA UR CULT: NORMAL
BACTERIA: NEGATIVE /HPF
BILIRUBIN URINE: NEGATIVE
BLOOD URINE: ABNORMAL
CAST: 0 /LPF
COLOR: YELLOW
EPITHELIAL CELLS: 0 /HPF
GLUCOSE QUALITATIVE U: NEGATIVE MG/DL
KETONES URINE: NEGATIVE MG/DL
LEUKOCYTE ESTERASE URINE: NEGATIVE
MICROSCOPIC-UA: NORMAL
NITRITE URINE: NEGATIVE
PH URINE: 6
PROTEIN URINE: NEGATIVE MG/DL
PSA SERPL-MCNC: 2.78 NG/ML
RED BLOOD CELLS URINE: 0 /HPF
SPECIFIC GRAVITY URINE: 1.01
UROBILINOGEN URINE: 0.2 MG/DL
WHITE BLOOD CELLS URINE: 0 /HPF

## 2024-06-07 ENCOUNTER — TRANSCRIPTION ENCOUNTER (OUTPATIENT)
Age: 59
End: 2024-06-07

## 2024-09-09 ENCOUNTER — NON-APPOINTMENT (OUTPATIENT)
Age: 59
End: 2024-09-09

## 2024-09-10 ENCOUNTER — APPOINTMENT (OUTPATIENT)
Dept: NEUROLOGY | Facility: CLINIC | Age: 59
End: 2024-09-10
Payer: COMMERCIAL

## 2024-09-10 VITALS
BODY MASS INDEX: 24.38 KG/M2 | SYSTOLIC BLOOD PRESSURE: 143 MMHG | WEIGHT: 190 LBS | DIASTOLIC BLOOD PRESSURE: 90 MMHG | HEART RATE: 61 BPM | HEIGHT: 74 IN

## 2024-09-10 DIAGNOSIS — G20.A1 PARKINSON'S DISEASE WITHOUT DYSKINESIA, WITHOUT MENTION OF FLUCTUATIONS: ICD-10-CM

## 2024-09-10 DIAGNOSIS — R41.89 OTHER SYMPTOMS AND SIGNS INVOLVING COGNITIVE FUNCTIONS AND AWARENESS: ICD-10-CM

## 2024-09-10 DIAGNOSIS — G47.30 SLEEP APNEA, UNSPECIFIED: ICD-10-CM

## 2024-09-10 PROCEDURE — 99214 OFFICE O/P EST MOD 30 MIN: CPT

## 2024-09-10 PROCEDURE — G2211 COMPLEX E/M VISIT ADD ON: CPT | Mod: NC

## 2024-09-10 NOTE — HISTORY OF PRESENT ILLNESS
[FreeTextEntry1] : Franky Mclean is a 56-year-old right-handed male who presents with chief complaints of Parkinson's disease.  At this visit he is accompanied by his wife.  History is obtained from both of them and from review of records  Patient states that for the last year he has noticed dragging of the left foot reduced ringing of the left arm he was reporting funny feelings in his left side and also having difficulty coming up with the right word.  An MRI brain done February 2021 was unremarkable.  MRI lumbar spine done September 28, 2020 showed degenerative disc disease and facet arthropathy most prominent at L5-S1 where there is moderate bilateral neuroforaminal stenosis. DaTscan done September 1, 2021 was abnormal showing dopamine dysfunction, consistent with Parkinson's disease  Patient was initiated on Sinemet 25/100 half a tablet three times a day.  He reports 70% improvement of his walking balance and speaking. He is also undergoing physical therapy twice a week  His wife reports mild memory changes mostly short-term but they have improved since levodopa was started, no hallucinations Denied dysphagia, drooling, urinary incontinence, anosmia ,blood pressure fluctuations or REM behavior disorder. Mild constipation  Review of systems-a complete review of systems was performed and is negative except as listed in HPI  Family history-Father had possible Parkinson's disease in his late 50s, passed away in his 70s  Interval history February 1, 2022.-Patient presents to follow-up on Parkinson's disease.  He states that he tried both ropinirole and pramipexole however even at low doses he felt that his short-term memory was worse.  At present he feels that his long-term memory is excellent but short-term memory is not good.  He denies any changes in mood.  Currently taking Sinemet half a tablet twice a day.  He does feel some response with this medication.  He feels his balance is not good, he needs to concentrate on walking heel-to-toe.  He is not had any falls he goes for physical therapy twice a week.  He reports numbness, not exactly pain in his lower back and left leg.  No dysphagia no hallucinations  Interval history May 13, 2022-patient presents to follow-up on Parkinson's disease.  He states that he has good days and bad days.  He continues to feel that her short-term memory is not so good on some days.  The other times he feels well.  He denies any hallucinations.  He does endorse history of sleep apnea and does not use the machine because when he used it in the past he actually developed a pneumonia from it.  He feels stiffness and pain in the left upper extremity at times.  Currently uses Sinemet 1 tablet at 6 AM and 1 at 1 PM.  He goes to bed at 11 PM and wakes up at 5 PM.  Some days he feels tired and may take a nap.  He denies any dysphagia or falls.  He does exercise regularly with the [employer] and stretching.  His back pain and leg pain resolved hence he canceled the EMG appointment.  He had recent labs.  He also has an upcoming appointment with neuropsychology.  He states his mood is generally good.  He reports having erectile dysfunction and constipation  Interval history-September 1, 2022 patient presents to follow-up on Parkinson's disease.  He states that he feels a little slow in his walking and may drag his left leg.  He is also reporting return of low back pain which radiates up to his left hip.  He is not had any falls.  He is currently taking Sinemet 25/100, 1 tablet twice a day 5:30 in the morning and at 12 noon.  He denies any side effects on this.  No falls no difficulty swallowing no difficulty with control of blood pressure no REM behavior disorder or vivid dreams at present time he has had some vivid dreams in the past.  Memory is unchanged he had neuropsych testing done recently.  Continues to have constipation and uses Metamucil tablets as needed  Interval history January 13, 2023.  Patient presents to follow-up on Parkinson's disease overall states that he is doing well still taking only Sinemet twice a day and finds that that is helpful if he does not take his medication in the morning he feels a little stiffness in his neck.  He is noticing some slowness while walking and has no difficulty running and has resumed basketball.  States that balance is good has not had any falls denies any difficulty swallowing.  Left-sided sciatica has improved with physical therapy even a small dose of gabapentin was making him sleepy and he is not on it anymore.  Did not try Azilect.  Still continues to have constipation and is taking stool softeners for it  Interval history May 25, 2023.  In terms of Parkinson's disease patient states that he is stable may be a little slower than before.  Still takes Sinemet only twice a day reporting numbness on the left side of his back.  He states that since area that usually feels numb has been going on for about a month.with sudden movements sometimes he has burning pain in that area which also resolves after some time.  No changes in bowel or bladder habits.  Still has constipation, chronic.  Denies any sciatica-like symptoms there is no radiation of pain and seems to stay localized in the left paraspinal region  Interval history May 8, 2024.  Patient is seen after 1 year to follow-up on Parkinson's disease.  He presents with several complaints today.  States that he has had sciatica-like symptoms on the left side.  The paraspinal pain that he had has now improved and he feels that if he works outside and does not take his shirt off on time then it feels a little irritated.  He did not go for MRI of the cervical or thoracic spine.  He states that overall he has slowed down and daily activities fine motor skills including handwriting shaving turning pages of a book counting money etc. are becoming slower also his walking has become slower.  He finds that when he takes Sinemet there is improvement in speech.  He has not had any falls he did have physical therapy in the past but has not been there for some time and did find it helpful.  No dysphagia.  He feels memory is also not so good he feels tired during daytime 5 years ago had a sleep study done and was told that he has mild sleep apnea at that time was not able to tolerate CPAP.  He does endorse snoring and feeling tired during daytime sometimes may have headaches on waking up  Interval history September 10, 2024.  Patient presents to follow-up on Parkinson's disease.  States that sciatica pain is much better now he occasionally uses gabapentin.  Currently he is on Sinemet 2 tablets at 6 AM and 2 tablets at 10 AM.  He does not take anything the rest of the day states that he does not feel he needs it.  Did not want the 2 tablets twice a day he states that he feels sleepy he has not gone for sleep medicine evaluation yet.  States that in the past he has been diagnosed as having sleep apnea but does not use the mask.  He had 1 fall while walking outside when he tripped on a curb other than that no falls no dysphagia in terms of memory he feels that long-term memory is quite good but short-term memory gets worse when the pills wear off.  He denies any hallucinations he states that he walks regularly with his riley retriever Jack

## 2024-09-10 NOTE — DISCUSSION/SUMMARY
[FreeTextEntry1] : This is a 59-year-old right-handed male with chief complaints of Parkinson's disease symptoms since 2020.  He reports feeling cognitively clearer with sinemet, but sleepy/fatigued.  His father also developed Parkinson's disease in his 50s DaTscan abnormal, MRI brain unremarkable Did not tolerate pramipexole or ropinirole due to cognitive side effects Neuropsych evaluation-[facility] Has history of sleep apnea not treated Impression- parkinsonism, most likely idiopathic Parkinson's disease, family history    Plan -Increase Sinemet gently over time to 2 tablets 2- 3 times a day Encouraged to go for sleep study.  Patient is agreeable, script provided In the past normal B12 folate thyroid.  Neuropsych testing consistent with [facility].  May consider repeating in the future Follow-up in 3 months In the past discussed genetics consultation, agrees to get it done, script provided today,   All questions addressed and answered We discussed the above impression, plan and recommendation during the visit. Counseling represented more than 50% of the 30-minute visit time

## 2024-09-10 NOTE — DATA REVIEWED
[de-identified] : MRI brain done in February 2021 unremarkable\par  MRI lumbar spine done September 2020 with degenerative changes most prominent at L5-S1 with bilateral neuroforaminal stenosis

## 2024-09-10 NOTE — PHYSICAL EXAM
[FreeTextEntry1] : Patient is awake and alert.  He is pleasant and cooperative with the exam.  Facial expression is reduced Face is symmetric.  Extraocular movements are intact. No resting action or postural tremors are seen  bradykinesia hand opening and closing 1 on the right, 3 on the left Rapid alternating movements 1 on the right 3 on the left Finger taps 1 on the right 3 on the left Mild rigidity on the left Leg agility normal on the right 1 on the left No difficulty getting up from the chair Gait with shuffling, small steps, reduced bilateral left greater than right arm swing otherwise unremarkable Pull test is negative Deep tendon reflexes are equal and symmetric, strength 5/5

## 2024-11-05 ENCOUNTER — NON-APPOINTMENT (OUTPATIENT)
Age: 59
End: 2024-11-05

## 2025-03-14 ENCOUNTER — APPOINTMENT (OUTPATIENT)
Dept: NEUROLOGY | Facility: CLINIC | Age: 60
End: 2025-03-14
Payer: COMMERCIAL

## 2025-03-14 ENCOUNTER — NON-APPOINTMENT (OUTPATIENT)
Age: 60
End: 2025-03-14

## 2025-03-14 VITALS
WEIGHT: 190 LBS | HEART RATE: 67 BPM | SYSTOLIC BLOOD PRESSURE: 137 MMHG | HEIGHT: 74 IN | DIASTOLIC BLOOD PRESSURE: 88 MMHG | BODY MASS INDEX: 24.38 KG/M2

## 2025-03-14 DIAGNOSIS — G20.A1 PARKINSON'S DISEASE WITHOUT DYSKINESIA, WITHOUT MENTION OF FLUCTUATIONS: ICD-10-CM

## 2025-03-14 PROCEDURE — G2211 COMPLEX E/M VISIT ADD ON: CPT | Mod: NC

## 2025-03-14 PROCEDURE — 99214 OFFICE O/P EST MOD 30 MIN: CPT

## 2025-08-19 ENCOUNTER — APPOINTMENT (OUTPATIENT)
Dept: NEUROLOGY | Facility: CLINIC | Age: 60
End: 2025-08-19
Payer: COMMERCIAL

## 2025-08-19 VITALS
BODY MASS INDEX: 23.74 KG/M2 | WEIGHT: 185 LBS | HEART RATE: 60 BPM | HEIGHT: 74 IN | SYSTOLIC BLOOD PRESSURE: 134 MMHG | DIASTOLIC BLOOD PRESSURE: 82 MMHG

## 2025-08-19 DIAGNOSIS — G20.A1 PARKINSON'S DISEASE WITHOUT DYSKINESIA, WITHOUT MENTION OF FLUCTUATIONS: ICD-10-CM

## 2025-08-19 PROCEDURE — 99214 OFFICE O/P EST MOD 30 MIN: CPT

## 2025-08-19 PROCEDURE — G2211 COMPLEX E/M VISIT ADD ON: CPT | Mod: NC

## 2025-08-19 RX ORDER — LINACLOTIDE 72 UG/1
72 CAPSULE, GELATIN COATED ORAL
Qty: 90 | Refills: 3 | Status: ACTIVE | COMMUNITY
Start: 2025-08-19 | End: 1900-01-01

## 2025-08-19 RX ORDER — CARBIDOPA, LEVODOPA AND ENTACAPONE 50; 200; 200 MG/1; MG/1; MG/1
50-200-200 TABLET, FILM COATED ORAL 4 TIMES DAILY
Qty: 360 | Refills: 3 | Status: ACTIVE | COMMUNITY
Start: 2025-08-19 | End: 1900-01-01